# Patient Record
(demographics unavailable — no encounter records)

---

## 2024-10-10 NOTE — HISTORY OF PRESENT ILLNESS
[de-identified] : 10/09/2024: Patient is 84 year old female here for her knees. States she has pain stiffness, swelling on both her knees. Would like to discuss b/l knee replacement.  She had done CSI injection which helped for a while. Right knee is worse than left. She has seen Dr. Shi for the hips  [FreeTextEntry1] : b/l knee

## 2024-10-10 NOTE — ASSESSMENT
[FreeTextEntry1] : 84F p/w adv alex knee OA, severe exacerbation  She would like to proceed with R TKA after the holidays, santos ozuna explained to patient, we will call to schedule, preop CT to eval bone loss   We discussed my findings and the natural history of their condition. We talked about the details of the proposed surgery and the recovery. We discussed the material risks, possible benefits and alternatives to surgery. The risks include but are not limited to infection, bleeding and possible need for blood transfusion, fracture, bowel blockage, bladder retention or infection, need for reoperation, stiffness and/or limited range of motion, possible damage to nerves and blood vessels, failure of fixation of components, risk of deep vein thromboses and pulmonary embolism, wound healing problems, dislocation, and possible leg length discrepancy. Although incredibly rare, we also discussed the risks of a cardiac event, stroke and even death during, or following, the surgery. We discussed the type of implants the patient will be receiving and the type of fixation that will be used, as well as whether a robot or computer navigation aide will be used. The patient understands they will need medical clearance and will attend a preoperative joint education class. We also discussed the type of anesthesia they will receive, and the risks associated with hospital or rehab length of stay, obesity, diabetes and smoking.

## 2024-10-10 NOTE — PHYSICAL EXAM
[NL (140)] : flexion 140 degrees [NL (0)] : extension 0 degrees [5___] : hamstring 5[unfilled]/5 [] : ligamentously stable [Bilateral] : knee bilaterally [advanced tricompartmental OA with lateral compartment narrowing and valgus alignment] : advanced tricompartmental OA with lateral compartment narrowing and valgus alignment